# Patient Record
Sex: MALE | Race: WHITE | NOT HISPANIC OR LATINO | URBAN - METROPOLITAN AREA
[De-identification: names, ages, dates, MRNs, and addresses within clinical notes are randomized per-mention and may not be internally consistent; named-entity substitution may affect disease eponyms.]

---

## 2017-04-04 NOTE — PATIENT DISCUSSION
POAG, OU: INTRAOCULAR PRESSURE IS WITHIN ACCEPTABLE LIMITS. PT INSTRUCTED TO CONTINUE TREATMENT WITH LATANOPROST QHS OU AND RETURN FOR FOLLOW-UP AS SCHEDULED.

## 2017-04-04 NOTE — PATIENT DISCUSSION
PLAQUENIL DISCUSSION:  I have discussed the implications of Plaquenil with the patient and the potential for retinal injury. Even though this is uncommon, all patients on Plaquenil should have regular eye exams, and should notify the eye doctor immediately if any new visual changes occur.

## 2017-04-04 NOTE — PATIENT DISCUSSION
PLAQUENIL - ADVISED PT TO CONTINUE AS PLANNED WITH PRIMARY CARE PHYSICIAN, FOLLOW EVERY 6 MONTHS WITH DILATED EXAM.

## 2017-06-08 NOTE — PATIENT DISCUSSION
EPIRETINAL MEMBRANE OU: ERM NOT VISUALLY SIGNIFICANT TO THE PATIENT AT THIS TIME. WILL CONTINUE TO FOLLOW WITH EXAMS AND OCT'S AS SCHEDULED.

## 2017-08-28 NOTE — PATIENT DISCUSSION
POAG, OU: INTRAOCULAR PRESSURE IS WITHIN ACCEPTABLE LIMITS. PT INSTRUCTED TO CONTINUE LATANOPROST AND RETURN FOR FOLLOW-UP AS SCHEDULED.

## 2022-01-19 ENCOUNTER — COMPREHENSIVE EXAM (OUTPATIENT)
Dept: URBAN - METROPOLITAN AREA CLINIC 16 | Facility: CLINIC | Age: 72
End: 2022-01-19

## 2022-01-19 DIAGNOSIS — H52.4: ICD-10-CM

## 2022-01-19 DIAGNOSIS — Z01.00: ICD-10-CM

## 2022-01-19 DIAGNOSIS — H52.223: ICD-10-CM

## 2022-01-19 DIAGNOSIS — H52.13: ICD-10-CM

## 2022-01-19 DIAGNOSIS — H25.13: ICD-10-CM

## 2022-01-19 PROCEDURE — 92015 DETERMINE REFRACTIVE STATE: CPT

## 2022-01-19 PROCEDURE — 92004 COMPRE OPH EXAM NEW PT 1/>: CPT

## 2022-01-19 ASSESSMENT — TONOMETRY
OS_IOP_MMHG: 10
OD_IOP_MMHG: 12

## 2022-01-19 ASSESSMENT — KERATOMETRY
OD_K1POWER_DIOPTERS: 43.25
OD_AXISANGLE2_DEGREES: 89
OS_AXISANGLE2_DEGREES: 67
OS_AXISANGLE_DEGREES: 157
OS_K1POWER_DIOPTERS: 43.00
OD_AXISANGLE_DEGREES: 179
OS_K2POWER_DIOPTERS: 43.50
OD_K2POWER_DIOPTERS: 43.50

## 2022-01-19 ASSESSMENT — VISUAL ACUITY
OD_SC: 20/100-1
OS_SC: 20/100-2
OU_SC: 20/100+1

## 2022-01-19 NOTE — PATIENT DISCUSSION
Moderate revision to spec rx.  Patient agreed that revised rx is improved and he gained letters on the chart.

## 2022-06-13 ENCOUNTER — COMPREHENSIVE EXAM (OUTPATIENT)
Dept: URBAN - METROPOLITAN AREA CLINIC 16 | Facility: CLINIC | Age: 72
End: 2022-06-13

## 2022-06-13 DIAGNOSIS — H25.13: ICD-10-CM

## 2022-06-13 DIAGNOSIS — H43.812: ICD-10-CM

## 2022-06-13 PROCEDURE — 92014 COMPRE OPH EXAM EST PT 1/>: CPT

## 2022-06-13 ASSESSMENT — VISUAL ACUITY
OD_CC: 20/20
OS_CC: 20/20

## 2022-06-13 ASSESSMENT — TONOMETRY
OS_IOP_MMHG: 13
OD_IOP_MMHG: 11

## 2022-06-13 ASSESSMENT — KERATOMETRY
OS_AXISANGLE_DEGREES: 157
OS_AXISANGLE2_DEGREES: 67
OS_K2POWER_DIOPTERS: 43.50
OD_AXISANGLE_DEGREES: 179
OS_K1POWER_DIOPTERS: 43.00
OD_K2POWER_DIOPTERS: 43.50
OD_K1POWER_DIOPTERS: 43.25
OD_AXISANGLE2_DEGREES: 89

## 2022-07-01 RX ORDER — LEVETIRACETAM 750 MG/1
TABLET ORAL
COMMUNITY

## 2022-07-01 RX ORDER — LAMOTRIGINE 100 MG/1
TABLET ORAL
COMMUNITY

## 2022-07-28 ENCOUNTER — FOLLOW UP (OUTPATIENT)
Dept: URBAN - METROPOLITAN AREA CLINIC 16 | Facility: CLINIC | Age: 72
End: 2022-07-28

## 2022-07-28 DIAGNOSIS — H35.431: ICD-10-CM

## 2022-07-28 DIAGNOSIS — Z01.00: ICD-10-CM

## 2022-07-28 DIAGNOSIS — H25.13: ICD-10-CM

## 2022-07-28 DIAGNOSIS — H33.321: ICD-10-CM

## 2022-07-28 DIAGNOSIS — H43.812: ICD-10-CM

## 2022-07-28 PROCEDURE — 92014 COMPRE OPH EXAM EST PT 1/>: CPT

## 2022-07-28 ASSESSMENT — KERATOMETRY
OS_K1POWER_DIOPTERS: 43.00
OS_AXISANGLE_DEGREES: 157
OS_AXISANGLE2_DEGREES: 67
OS_K2POWER_DIOPTERS: 43.50
OD_AXISANGLE2_DEGREES: 89
OD_K1POWER_DIOPTERS: 43.25
OD_K2POWER_DIOPTERS: 43.50
OD_AXISANGLE_DEGREES: 179

## 2022-07-28 ASSESSMENT — VISUAL ACUITY
OD_CC: 20/20
OS_CC: 20/20
OS_SC: 20/50-1
OD_SC: 20/40-1
OU_SC: 20/50

## 2022-07-28 ASSESSMENT — TONOMETRY
OD_IOP_MMHG: 15
OS_IOP_MMHG: 17

## 2022-08-25 ENCOUNTER — ESTABLISHED PATIENT (OUTPATIENT)
Dept: URBAN - METROPOLITAN AREA CLINIC 14 | Facility: CLINIC | Age: 72
End: 2022-08-25

## 2022-08-25 DIAGNOSIS — H43.812: ICD-10-CM

## 2022-08-25 DIAGNOSIS — H25.13: ICD-10-CM

## 2022-08-25 PROCEDURE — 92014 COMPRE OPH EXAM EST PT 1/>: CPT

## 2022-08-25 PROCEDURE — 92134 CPTRZ OPH DX IMG PST SGM RTA: CPT

## 2022-08-25 ASSESSMENT — KERATOMETRY
OD_K2POWER_DIOPTERS: 43.50
OS_K2POWER_DIOPTERS: 43.50
OD_AXISANGLE_DEGREES: 179
OS_AXISANGLE_DEGREES: 157
OS_AXISANGLE2_DEGREES: 67
OS_K1POWER_DIOPTERS: 43.00
OD_K1POWER_DIOPTERS: 43.25
OD_AXISANGLE2_DEGREES: 89

## 2022-08-25 ASSESSMENT — VISUAL ACUITY
OS_CC: 20/25
OD_CC: 20/25

## 2022-08-25 ASSESSMENT — TONOMETRY
OS_IOP_MMHG: 17
OD_IOP_MMHG: 14

## 2024-07-31 NOTE — PATIENT DISCUSSION
Moderate revision to spec rx.  Patient agreed that revised rx is improved and he gained letters on the chart. This is a surgical and/or non-medical patient.